# Patient Record
Sex: FEMALE | Race: WHITE | URBAN - METROPOLITAN AREA
[De-identification: names, ages, dates, MRNs, and addresses within clinical notes are randomized per-mention and may not be internally consistent; named-entity substitution may affect disease eponyms.]

---

## 2022-03-03 ENCOUNTER — NEW PATIENT (OUTPATIENT)
Dept: URBAN - METROPOLITAN AREA CLINIC 1 | Facility: CLINIC | Age: 57
End: 2022-03-03

## 2022-03-03 DIAGNOSIS — E11.3593: ICD-10-CM

## 2022-03-03 DIAGNOSIS — H25.813: ICD-10-CM

## 2022-03-03 DIAGNOSIS — Z79.4: ICD-10-CM

## 2022-03-03 DIAGNOSIS — H50.52: ICD-10-CM

## 2022-03-03 DIAGNOSIS — H53.001: ICD-10-CM

## 2022-03-03 DIAGNOSIS — H40.033: ICD-10-CM

## 2022-03-03 PROCEDURE — 92015 DETERMINE REFRACTIVE STATE: CPT

## 2022-03-03 PROCEDURE — 99204 OFFICE O/P NEW MOD 45 MIN: CPT

## 2022-03-03 ASSESSMENT — VISUAL ACUITY
OS_CC: 20/30
OS_BAT: 20/100
OD_BAT: 20/100
OD_CC: 20/60

## 2022-03-03 ASSESSMENT — TONOMETRY
OD_IOP_MMHG: 21
OS_IOP_MMHG: 20

## 2022-03-03 NOTE — PATIENT DISCUSSION
New onset, likely given uncontrolled DM. Will consider Prism in specs. Recommend taping lens OD or patching OD.

## 2022-03-03 NOTE — PATIENT DISCUSSION
Visually significant, however will hold off on phaco at this time until diplopia resolves. The patient was advised to contact office with any change or worsening of vision.

## 2022-03-03 NOTE — PATIENT DISCUSSION
Discussed the pathophysiology of diabetes and its effect on the eye and risk of blindness. Stressed the importance of strong glucose control. Advised the importance of at least yearly dilated examinations, but to contact us immediately for any problems or concerns.